# Patient Record
Sex: MALE | Race: AMERICAN INDIAN OR ALASKA NATIVE
[De-identification: names, ages, dates, MRNs, and addresses within clinical notes are randomized per-mention and may not be internally consistent; named-entity substitution may affect disease eponyms.]

---

## 2022-08-29 ENCOUNTER — HOSPITAL ENCOUNTER (EMERGENCY)
Dept: HOSPITAL 5 - ED | Age: 47
Discharge: HOME | End: 2022-08-29
Payer: COMMERCIAL

## 2022-08-29 VITALS — SYSTOLIC BLOOD PRESSURE: 151 MMHG | DIASTOLIC BLOOD PRESSURE: 94 MMHG

## 2022-08-29 DIAGNOSIS — S68.121A: Primary | ICD-10-CM
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
## 2022-08-29 DIAGNOSIS — Z79.899: ICD-10-CM

## 2022-08-29 PROCEDURE — 96375 TX/PRO/DX INJ NEW DRUG ADDON: CPT

## 2022-08-29 PROCEDURE — 90471 IMMUNIZATION ADMIN: CPT

## 2022-08-29 PROCEDURE — 90714 TD VACC NO PRESV 7 YRS+ IM: CPT

## 2022-08-29 PROCEDURE — 96365 THER/PROPH/DIAG IV INF INIT: CPT

## 2022-08-29 PROCEDURE — 73120 X-RAY EXAM OF HAND: CPT

## 2022-08-29 PROCEDURE — 99283 EMERGENCY DEPT VISIT LOW MDM: CPT

## 2022-08-29 NOTE — EVENT NOTE
ED Screening Note


ED Screening Note: 


47-year-old male presenting with traumatic amputation of his finger while at 

work prior to arrival





General: Nontoxic appearing no acute distress


Cardiac: Regular rate, normal heart sounds


Respiratory: Normal lung sounds bilaterally no use of  muscles


GI/-normal sounds, nontender no guarding


Musculoskeletal-traumatic amputation left second digit at the DIP joint.  No 

pulsatile bleeding pulses cap refill intact 


Neuro-alert oriented x4.








In the setting of a significantly high volume and record number of patients 

presenting to the emergency department and the fact that we have a limited space

to see patients we have implemented the provider in triage protocol this allows 

an expedited initial exam of patients that might otherwise have left without 

being seen or who would wait longer than usual to be seen by provider.  I 

interviewed the patient and performed a limited physical exam.  This patient is 

a pulled from the waiting room to triage room for an initial assessment of 

adrenal studies and then returned to the waiting room pending results of the 

studies.  The ultimate final evaluation and disposition may be performed by 

another provider depending on room and provider availability.

## 2022-08-29 NOTE — EMERGENCY DEPARTMENT REPORT
ED Upper Extremity Inj HPI





- General


Chief Complaint: Extremity Injury, Upper


Stated Complaint: LEFT HAND INJURY


Time Seen by Provider: 08/29/22 11:15


Source: patient


Mode of arrival: Ambulatory


Limitations: Physical Limitation





- History of Present Illness


Initial Comments: 





Patient is a 47-year-old male presented ED status post traumatic amputation of h

is left index finger.  He is right-handed.  States he was unloading heavy metal 

pipes and got his finger caught between a pipe and a strap.  Incident occurred 

at approximately 9 AM this morning.  Tetanus status unknown.





- Related Data


                                  Previous Rx's











 Medication  Instructions  Recorded  Last Taken  Type


 


HYDROcodone/APAP 5-325 [[place] 1 each PO Q6HR PRN #15 tablet 08/29/22 Unknown Rx





5/325]    


 


cephALEXin [Keflex] 500 mg PO Q12HR #20 cap 08/29/22 Unknown Rx











                                    Allergies











Allergy/AdvReac Type Severity Reaction Status Date / Time


 


No Known Allergies Allergy   Verified 08/29/22 09:51














ED Review of Systems


ROS: 


Stated complaint: LEFT HAND INJURY


Other details as noted in HPI





Constitutional: denies: chills, fever


Respiratory: denies: cough, shortness of breath, wheezing


Cardiovascular: denies: chest pain, palpitations


Gastrointestinal: denies: abdominal pain, nausea, diarrhea


Musculoskeletal: denies: back pain, joint swelling, arthralgia


Skin: denies: rash, lesions


Neurological: denies: headache, weakness, paresthesias


Psychiatric: denies: anxiety, depression





ED Past Medical Hx





- Medications


Home Medications: 


                                Home Medications











 Medication  Instructions  Recorded  Confirmed  Last Taken  Type


 


HYDROcodone/APAP 5-325 [[place] 1 each PO Q6HR PRN #15 tablet 08/29/22  Unknown Rx





5/325]     


 


cephALEXin [Keflex] 500 mg PO Q12HR #20 cap 08/29/22  Unknown Rx














ED Physical Exam





- General


Limitations: Physical Limitation


General appearance: alert, in no apparent distress





- Head


Head exam: Present: atraumatic, normocephalic





- Respiratory


Respiratory exam: Present: normal lung sounds bilaterally.  Absent: respiratory 

distress





- Cardiovascular


Cardiovascular Exam: Present: regular rate, normal rhythm, normal heart sounds





- GI/Abdominal


GI/Abdominal exam: Present: soft.  Absent: distended, tenderness





- Rectal


Rectal exam: Present: deferred





- Extremities Exam


Extremities exam: Present: other (Partial amputation of the left index finger at

 the DIP joint.  Distal end remains intact via a small portion of tissue)





- Neurological Exam


Neurological exam: Present: alert, oriented X3





- Psychiatric


Psychiatric exam: Present: normal affect, normal mood





- Skin


Skin exam: Present: warm, dry, normal color





ED Course





                                   Vital Signs











  08/29/22





  09:37


 


Temperature 98.4 F


 


Pulse Rate 99 H


 


Respiratory 18





Rate 


 


Blood Pressure 153/113





[Right] 


 


O2 Sat by Pulse 99





Oximetry 














ED Medical Decision Making





- Medical Decision Making





I discussed the case with on-call hand surgeon at [facility].  I also provided her 

with a picture of the patient's finger.  States that the distal tip would likely

 need to be amputated and suggested outpatient follow-up.  Distal tip was 

removed by me here in the emergency department.  Bleeding was controlled using 

Surgicel and direct pressure.  Patient provided with phone number to contact 

[facility] for follow-up appointment.


Critical care attestation.: 


If time is entered above; I have spent that time in minutes in the direct care 

of this critically ill patient, excluding procedure time.








ED Disposition


Clinical Impression: 


 Traumatic amputation of tip of left index finger





Disposition: 01 HOME / SELF CARE / HOMELESS


Is pt being admited?: No


Condition: Stable


Instructions:  Traumatic Finger Amputation


Additional Instructions: 


Please contact [facility] at [phone] to schedule your follow-up 

appointment with hand surgery.  Please do not remove your dressing until follow-

up.


Referrals: 


PRIMARY CARE,MD [Primary Care Provider] - 3-5 Days


Time of Disposition: 13:23

## 2022-08-30 ENCOUNTER — HOSPITAL ENCOUNTER (EMERGENCY)
Dept: HOSPITAL 5 - ED | Age: 47
Discharge: LEFT BEFORE DISCHARGE | End: 2022-08-30
Payer: COMMERCIAL

## 2022-08-30 DIAGNOSIS — Z53.21: ICD-10-CM

## 2022-08-30 DIAGNOSIS — Z00.00: Primary | ICD-10-CM
